# Patient Record
Sex: FEMALE | Race: ASIAN | NOT HISPANIC OR LATINO
[De-identification: names, ages, dates, MRNs, and addresses within clinical notes are randomized per-mention and may not be internally consistent; named-entity substitution may affect disease eponyms.]

---

## 2017-01-10 PROBLEM — Z00.00 ENCOUNTER FOR PREVENTIVE HEALTH EXAMINATION: Status: ACTIVE | Noted: 2017-01-10

## 2017-02-08 ENCOUNTER — APPOINTMENT (OUTPATIENT)
Dept: UROLOGY | Facility: CLINIC | Age: 42
End: 2017-02-08

## 2017-02-08 DIAGNOSIS — Z84.1 FAMILY HISTORY OF DISORDERS OF KIDNEY AND URETER: ICD-10-CM

## 2017-02-08 DIAGNOSIS — N20.1 CALCULUS OF URETER: ICD-10-CM

## 2017-02-10 LAB
APPEARANCE: CLEAR
BACTERIA UR CULT: ABNORMAL
BACTERIA: NEGATIVE
BILIRUBIN URINE: NEGATIVE
BLOOD URINE: ABNORMAL
COLOR: YELLOW
GLUCOSE QUALITATIVE U: NORMAL MG/DL
HYALINE CASTS: 0 /LPF
KETONES URINE: NEGATIVE
LEUKOCYTE ESTERASE URINE: NEGATIVE
MICROSCOPIC-UA: NORMAL
NITRITE URINE: NEGATIVE
PH URINE: 5.5
PROTEIN URINE: NEGATIVE MG/DL
RED BLOOD CELLS URINE: 6 /HPF
SPECIFIC GRAVITY URINE: 1.02
SQUAMOUS EPITHELIAL CELLS: 2 /HPF
UROBILINOGEN URINE: NORMAL MG/DL
WHITE BLOOD CELLS URINE: 1 /HPF

## 2017-02-22 ENCOUNTER — APPOINTMENT (OUTPATIENT)
Dept: UROLOGY | Facility: CLINIC | Age: 42
End: 2017-02-22

## 2017-04-05 ENCOUNTER — APPOINTMENT (OUTPATIENT)
Dept: UROLOGY | Facility: CLINIC | Age: 42
End: 2017-04-05

## 2020-04-26 ENCOUNTER — MESSAGE (OUTPATIENT)
Age: 45
End: 2020-04-26

## 2021-09-24 ENCOUNTER — APPOINTMENT (OUTPATIENT)
Dept: PEDIATRIC ALLERGY IMMUNOLOGY | Facility: CLINIC | Age: 46
End: 2021-09-24
Payer: MEDICAID

## 2021-09-24 DIAGNOSIS — L50.8 OTHER URTICARIA: ICD-10-CM

## 2021-09-24 DIAGNOSIS — Z83.6 FAMILY HISTORY OF OTHER DISEASES OF THE RESPIRATORY SYSTEM: ICD-10-CM

## 2021-09-24 PROCEDURE — 99443: CPT

## 2021-09-25 PROBLEM — Z83.6 FAMILY HISTORY OF ALLERGIC RHINITIS: Status: ACTIVE | Noted: 2021-09-25

## 2021-09-25 PROBLEM — L50.8 ACUTE URTICARIA: Status: ACTIVE | Noted: 2021-09-25

## 2021-09-25 RX ORDER — METHYLPREDNISOLONE 4 MG/1
4 TABLET ORAL
Qty: 21 | Refills: 0 | Status: COMPLETED | COMMUNITY
Start: 2021-09-12 | End: 2021-09-25

## 2021-09-25 NOTE — HISTORY OF PRESENT ILLNESS
[de-identified] : This visit was was performed as a telephonic visit. The patient, SAIDA PITTMAN, was located at home at the time of the visit.\par The provider, ANTIONE ADORNO MD, was located at the medical office located in [address] [place] [address] at the time of the visit. The patient and provider participated in the telephonic encounter. \par Verbal consent for telehealth services was given on 9/24/21 by the patient. \par Of note, appointment was set up as audio-visual telehealth visit, however, patient was not able to set up her video access and preferred audio only.\par \par 46-year-old female generalized rash all over body 8 days after receiving her first dose of Pfizer COVID mRNA vaccine. Started from neck, traveled down to rest of the body. Lasted for days. Seen in the ER and given systemic steroids for 6 days. Immediately after starting steroids hives resolved. Continues to have itching and some red blotches on chest. Reports some dryness/peeling of skin as well. Today took Claritin for the first time, hasn't noticed significant improvement. \par No new skin care products. Same detergent. No recent infections. No family members with similar symptoms.  \par Denies history of hives in the past. \par Never used Miralax before. Never had colonoscopy. \par No history of allergic rhinitis, asthma, food allergy, eczema, medication allergy.\par

## 2021-09-25 NOTE — PHYSICAL EXAM
[Alert] : alert [No Acute Distress] : no acute distress [Supple] : the neck was supple [Normal Rate and Effort] : normal respiratory rhythm and effort [Normal Mood] : mood was normal [Normal Affect] : affect was normal [Alert, Awake, Oriented as Age-Appropriate] : alert, awake, oriented as age appropriate

## 2024-01-11 ENCOUNTER — APPOINTMENT (OUTPATIENT)
Dept: ORTHOPEDIC SURGERY | Facility: CLINIC | Age: 49
End: 2024-01-11
Payer: MEDICAID

## 2024-01-11 ENCOUNTER — NON-APPOINTMENT (OUTPATIENT)
Age: 49
End: 2024-01-11

## 2024-01-11 VITALS — BODY MASS INDEX: 25.87 KG/M2 | WEIGHT: 146 LBS | HEIGHT: 63 IN

## 2024-01-11 DIAGNOSIS — M79.671 PAIN IN RIGHT FOOT: ICD-10-CM

## 2024-01-11 DIAGNOSIS — M72.2 PLANTAR FASCIAL FIBROMATOSIS: ICD-10-CM

## 2024-01-11 PROCEDURE — 73630 X-RAY EXAM OF FOOT: CPT | Mod: RT

## 2024-01-11 PROCEDURE — 99203 OFFICE O/P NEW LOW 30 MIN: CPT | Mod: 25

## 2024-01-20 NOTE — PHYSICAL EXAM
[Normal] : Gait: normal [de-identified] : General: No acute distress Mental: Alert and oriented x3 Eyes: Conjunctivitis not seen Chest: Symmetric chest rise, no audible wheezing Skin: Bilateral lower extremities absent from rashes and ulcers Abdomen: No distention  Right foot: Intact skin, no swelling, no ecchymosis Tenderness at the heel along the plantar fascia Limited dorsiflexion with the knee flexed and extended. NVI [de-identified] : Right foot x-rays today show neutral alignment, well-maintained joint spaces, plantar heel spur, no fracture.

## 2024-01-20 NOTE — DISCUSSION/SUMMARY
[de-identified] : 48-year-old female with right foot plantar fasciitis and heel spur.  I recommended physiotherapy, stretching, compression, elevation, Tylenol or NSAIDs.  She has limited dorsiflexion with both knee extension and flexion.  I recommended follow-up with Dr. Sifuentes.

## 2024-01-20 NOTE — HISTORY OF PRESENT ILLNESS
[de-identified] : 48-year-old female with right plantar heel pain for the past 3 months, moderate intensity.  No associated injuries.  Pain is on the plantar aspect along the medial heel and radiating to the midfoot.  Pain is increased with ambulation and activity.  The pain is worst at first in the morning with the first few steps.  She reports history of small fifth metatarsal fracture.  She has not had treatment for it.

## 2024-12-11 ENCOUNTER — APPOINTMENT (OUTPATIENT)
Age: 49
End: 2024-12-11
Payer: MEDICAID

## 2024-12-11 PROCEDURE — D9310: CPT

## 2025-04-10 ENCOUNTER — APPOINTMENT (OUTPATIENT)
Age: 50
End: 2025-04-10